# Patient Record
Sex: FEMALE | Race: WHITE | NOT HISPANIC OR LATINO | Employment: FULL TIME | URBAN - METROPOLITAN AREA
[De-identification: names, ages, dates, MRNs, and addresses within clinical notes are randomized per-mention and may not be internally consistent; named-entity substitution may affect disease eponyms.]

---

## 2017-07-28 LAB — CRC RECOMMENDATION EXT: NORMAL

## 2019-04-11 LAB — RAPID GROUP A STREP (OHS): NEGATIVE

## 2021-11-24 LAB
BUN SERPL-MCNC: 11 MG/DL (ref 7–18)
CALCIUM SERPL-MCNC: 8.8 MG/DL (ref 8.5–10.1)
CHLORIDE SERPL-SCNC: 107 MMOL/L (ref 98–107)
CO2 SERPL-SCNC: 28 MMOL/L (ref 21–32)
CREAT SERPL-MCNC: 0.74 MG/DL (ref 0.6–1.3)
GLUCOSE SERPL-MCNC: 81 MG/DL (ref 74–106)
POTASSIUM SERPL-SCNC: 4 MMOL/L (ref 3.5–5.1)
SODIUM SERPL-SCNC: 139 MEQ/L (ref 131–145)

## 2021-12-13 LAB
CHOLEST SERPL-MCNC: 187 MG/DL
HDLC SERPL-MCNC: 74 MG/DL (ref 35–60)
LDLC SERPL CALC-MCNC: 104 MG/DL
TRIGL SERPL-MCNC: 45 MG/DL (ref 30–150)

## 2021-12-16 LAB — PAP RECOMMENDATION EXT: NORMAL

## 2022-02-21 LAB — BCS RECOMMENDATION EXT: NORMAL

## 2022-04-10 ENCOUNTER — HISTORICAL (OUTPATIENT)
Dept: ADMINISTRATIVE | Facility: HOSPITAL | Age: 57
End: 2022-04-10

## 2022-04-27 VITALS
HEIGHT: 67 IN | SYSTOLIC BLOOD PRESSURE: 124 MMHG | BODY MASS INDEX: 22.91 KG/M2 | WEIGHT: 146 LBS | OXYGEN SATURATION: 98 % | DIASTOLIC BLOOD PRESSURE: 76 MMHG

## 2022-07-28 PROBLEM — G47.00 INSOMNIA: Status: ACTIVE | Noted: 2022-07-28

## 2022-07-28 PROBLEM — E03.9 HYPOTHYROIDISM: Status: ACTIVE | Noted: 2022-07-28

## 2022-09-17 ENCOUNTER — HISTORICAL (OUTPATIENT)
Dept: ADMINISTRATIVE | Facility: HOSPITAL | Age: 57
End: 2022-09-17

## 2022-10-27 PROBLEM — Z28.21 IMMUNIZATION REFUSED: Status: ACTIVE | Noted: 2022-10-27

## 2022-10-27 PROBLEM — Z23 IMMUNIZATION DUE: Status: RESOLVED | Noted: 2022-10-27 | Resolved: 2022-10-27

## 2022-10-27 PROBLEM — Z23 IMMUNIZATION DUE: Status: ACTIVE | Noted: 2022-10-27

## 2022-11-08 ENCOUNTER — DOCUMENTATION ONLY (OUTPATIENT)
Dept: ADMINISTRATIVE | Facility: HOSPITAL | Age: 57
End: 2022-11-08

## 2023-01-26 PROBLEM — Z01.818 PRE-OP TESTING: Status: ACTIVE | Noted: 2023-01-26

## 2023-09-19 PROBLEM — R07.89 LEFT-SIDED CHEST WALL PAIN: Status: ACTIVE | Noted: 2023-09-19

## 2023-10-26 PROBLEM — H65.03 NON-RECURRENT ACUTE SEROUS OTITIS MEDIA OF BOTH EARS: Status: ACTIVE | Noted: 2023-10-26

## 2023-10-26 PROBLEM — R09.82 POSTNASAL DRIP: Status: ACTIVE | Noted: 2023-10-26

## 2024-03-05 ENCOUNTER — PATIENT OUTREACH (OUTPATIENT)
Dept: ADMINISTRATIVE | Facility: HOSPITAL | Age: 59
End: 2024-03-05
Payer: COMMERCIAL

## 2024-03-05 LAB — BCS RECOMMENDATION EXT: NORMAL

## 2024-03-05 NOTE — PROGRESS NOTES
Population Health. Out Reach. Reviewing patient's chart for quality metrics.Records request sent to Dr. Ambrocio Thompson and [facility] office for colonoscopy.    3/13/24 The following record(s)  below were uploaded for Health Maintenance .        7/28/2017 COLONOSCOPY

## 2024-03-05 NOTE — LETTER
AUTHORIZATION FOR RELEASE OF   CONFIDENTIAL INFORMATION    Dear Ambrocio Monroe,    We are seeing Georgia Berry, date of birth 1965, in the clinic at [facility]. Davis Mota MD is the patient's PCP. Georgia Berry has an outstanding lab/procedure at the time we reviewed her chart. In order to help keep her health information updated, she has authorized us to request the following medical record(s):        (  )  MAMMOGRAM                                      ( * )  COLONOSCOPY      (  )  PAP SMEAR                                          (  )  OUTSIDE LAB RESULTS     (  )  DEXA SCAN                                          (  )  EYE EXAM            (  )  FOOT EXAM                                          (  )  ENTIRE RECORD     (  )  OUTSIDE IMMUNIZATIONS                 (  )  _______________         Please fax records to [facility], Manuel, Chad B., MD, [phone].     If you have any questions, please contact Edi Andrade, [facility] at [phone].           Patient Name: Georgia Berry  : 1965  Patient Phone #: [phone]

## 2024-03-05 NOTE — LETTER
AUTHORIZATION FOR RELEASE OF   CONFIDENTIAL INFORMATION    Dear [facility],    We are seeing Georgia Berry, date of birth 1965, in the clinic at [facility]. Davis Mota MD is the patient's PCP. Georgia Berry has an outstanding lab/procedure at the time we reviewed her chart. In order to help keep her health information updated, she has authorized us to request the following medical record(s):        (  )  MAMMOGRAM                                      ( * )  COLONOSCOPY      (  )  PAP SMEAR                                          (  )  OUTSIDE LAB RESULTS     (  )  DEXA SCAN                                          (  )  EYE EXAM            (  )  FOOT EXAM                                          (  )  ENTIRE RECORD     (  )  OUTSIDE IMMUNIZATIONS                 (  )  _______________         Please fax records to [facility], Manuel, Chad B., MD, [phone].     If you have any questions, please contact Edi Andrade, [facility] at [phone].           Patient Name: Georgia Berry  : 1965  Patient Phone #: [phone]

## 2024-04-07 NOTE — PROGRESS NOTES
"Follow-up (3 month follow up)       HPI:    Patient presents for 3 month recheck/refill medications.  Patient states medications are working well; she is able to concentrate, focus and stay on task.  She denies anxiety, palpitations, unintentional weight loss, headaches or dry mouth.     reviewed.    Narcotics agreement updated 01/18/2024.    Current Outpatient Medications   Medication Instructions    calcium carbonate/vitamin D3 (CALCIUM 600 WITH VITAMIN D3 ORAL) Oral, Vitamin 3 25 mcg    cholecalciferol (vitamin D3) (VITAMIN D3) 1,000 Units, Oral, Daily    cyanocobalamin 1,000 mcg/mL injection INJECT 1 ML SUBCUTANEOUSLY EVERY 2 WEEKS    estradiol 0.1 mg/24 hr td ptwk 0.1 mg/24 hr PTWK 1 patch, Transdermal, Every 7 days    hyoscyamine (ANASPAZ,LEVSIN) 125 mcg, Oral, Every 6 hours PRN    levothyroxine (SYNTHROID) 150 mcg, Oral    lisdexamfetamine (VYVANSE) 40 mg, Oral, Every morning    lisdexamfetamine (VYVANSE) 40 mg, Oral, Daily, Fill: 5/08/2024    lisdexamfetamine (VYVANSE) 40 mg, Oral, Every morning, Fill: 6/07/2024    olmesartan (BENICAR) 40 MG tablet TAKE 1 TABLET BY MOUTH ONCE DAILY    polyethylene glycol (GLYCOLAX) 17 gram PwPk Oral, Daily    progesterone (PROMETRIUM) 100 MG capsule No dose, route, or frequency recorded.         ROS:    She has been feeling okay.   She has been feeling dizzy at times; she has not noticed in a few weeks. It usually occurs late am; transient in nature.   Her blood pressure in the afternoon is usually 120/80; some times it is in the 90's/60's.   She has been tired. She has also been constipated. She has been taking Miralax. Her joints have been achy. She reports cold intolerance. She reports increased hair loss. She has had these symptoms for months.  Her appetite has been good.    She has been sleeping well.        PE:    ..Visit Vitals  /70   Pulse 80   Temp 97.8 °F (36.6 °C)   Ht 5' 6" (1.676 m)   Wt 70.1 kg (154 lb 8 oz)   SpO2 97%   BMI 24.94 kg/m²        General: "  She is well-developed well-nourished white female in no apparent distress she is alert and oriented.    Chest: Clear to auscultation bilaterally.    CV: Regular rate and rhythm without murmurs rubs or gallops.  Bilateral lower extremities: Without edema.        1. ADHD, predominantly inattentive type  Overview:  Stable/patient doing well on current treatment, will continue to closely monitor, follow-up in 3 months. 3 prescriptions printed and hand given to patient at office visit today for 3-month supply. Risks/benefits/side effects of medication discussed with patient. Patient denies any insomnia or palpitations.     07/26/2023: Patient is doing well on medications; refills x3 given.    10/26/2023: Patient is doing well on medications; refills x3 given.    01/18/2024: Patient is doing well on medications; refills x3 given.  Narcotics agreement updated.    04/09/2024: Patient is doing well on medications; refills x3 given.      Orders:  -     lisdexamfetamine (VYVANSE) 40 MG Cap; Take 1 capsule (40 mg total) by mouth once daily. Fill: 5/08/2024  Dispense: 30 capsule; Refill: 0    2. Primary hypertension  Overview:  Stable and well controlled at this time. Continue current treatment. Limit salt in diet. Monitor BP at home.     07/26/2023: Patient's blood pressure noted to be 110/70.  Patient reports fatigue at times.    Patient advised to decrease her dosage of olmesartan to 1/2 tablet daily.  Patient to monitor pressures and let me know how pressures do as well as her energy levels.    01/18/2024: Patient states her blood pressures were running in the 90 over 60s when she was taking magnesium daily.    She decreased her magnesium intake to 3 times a week.  Her blood pressures have been running in the 110s over 80s.    04/09/2024: Patient's blood pressure still runs in the 90s over 60s at times.  She states it averages in the 110s over 80s.  Patient advised to monitor pressures regularly for the next 2 weeks and let  me know how it is running.      3. Hypothyroidism, unspecified type  Overview:  Synthroid 150mcg.  TSH 2.0 in 2/2023.     04/09/2024:  Patient reports fatigue, constipation, achy joints, cold intolerance and slightly increased hair loss.    TSH and CBC pending; lab order given to patient.    Orders:  -     TSH; Future; Expected date: 04/16/2024    4. Fatigue, unspecified type  Overview:  Patient reports fatigue, cold intolerance, joint aches, constipation and slightly increased hair loss.  CBC and TSH pending.    Orders:  -     CBC Auto Differential; Future; Expected date: 04/16/2024  -     TSH; Future; Expected date: 04/16/2024    Other orders  -     lisdexamfetamine (VYVANSE) 40 MG Cap; Take 1 capsule (40 mg total) by mouth every morning.  Dispense: 30 capsule; Refill: 0  -     lisdexamfetamine (VYVANSE) 40 MG Cap; Take 1 capsule (40 mg total) by mouth every morning. Fill: 6/07/2024  Dispense: 30 capsule; Refill: 0              ..Follow up in about 3 months (around 7/9/2024) for ADD Follow Up.       Future Appointments   Date Time Provider Department Center   7/9/2024  3:45 PM Davis Mota MD [facility]

## 2024-04-09 ENCOUNTER — OFFICE VISIT (OUTPATIENT)
Dept: PRIMARY CARE CLINIC | Facility: CLINIC | Age: 59
End: 2024-04-09
Payer: COMMERCIAL

## 2024-04-09 VITALS
OXYGEN SATURATION: 97 % | HEIGHT: 66 IN | DIASTOLIC BLOOD PRESSURE: 70 MMHG | WEIGHT: 154.5 LBS | TEMPERATURE: 98 F | HEART RATE: 80 BPM | SYSTOLIC BLOOD PRESSURE: 108 MMHG | BODY MASS INDEX: 24.83 KG/M2

## 2024-04-09 DIAGNOSIS — F90.0 ADHD, PREDOMINANTLY INATTENTIVE TYPE: Primary | ICD-10-CM

## 2024-04-09 DIAGNOSIS — R53.83 FATIGUE, UNSPECIFIED TYPE: ICD-10-CM

## 2024-04-09 DIAGNOSIS — E03.9 HYPOTHYROIDISM, UNSPECIFIED TYPE: ICD-10-CM

## 2024-04-09 DIAGNOSIS — I10 PRIMARY HYPERTENSION: ICD-10-CM

## 2024-04-09 PROCEDURE — 99214 OFFICE O/P EST MOD 30 MIN: CPT | Mod: ,,, | Performed by: FAMILY MEDICINE

## 2024-04-09 RX ORDER — LISDEXAMFETAMINE DIMESYLATE 40 MG/1
40 CAPSULE ORAL EVERY MORNING
Qty: 30 CAPSULE | Refills: 0 | Status: SHIPPED | OUTPATIENT
Start: 2024-04-09 | End: 2024-05-09

## 2024-04-09 RX ORDER — LISDEXAMFETAMINE DIMESYLATE 40 MG/1
40 CAPSULE ORAL DAILY
Qty: 30 CAPSULE | Refills: 0 | Status: SHIPPED | OUTPATIENT
Start: 2024-04-09 | End: 2024-05-09

## 2024-06-30 NOTE — PROGRESS NOTES
"Follow-up       HPI:    Patient presents for 3 month recheck/refill medications.  Patient states medications are working well; she is able to concentrate, focus and stay on task.  She denies anxiety, palpitations, unintentional weight loss, headaches or dry mouth.     reviewed.    Narcotics agreement updated 01/18/2024.      Current Outpatient Medications   Medication Instructions    calcium carbonate/vitamin D3 (CALCIUM 600 WITH VITAMIN D3 ORAL) Oral, Vitamin 3 25 mcg    cholecalciferol (vitamin D3) (VITAMIN D3) 1,000 Units, Daily    cyanocobalamin 1,000 mcg, Intramuscular, Every 30 days    estradiol 0.1 mg/24 hr td ptwk 0.1 mg/24 hr PTWK 1 patch, Transdermal, Every 7 days    hyoscyamine (ANASPAZ,LEVSIN) 125 mcg, Oral, Every 6 hours PRN    levothyroxine (SYNTHROID) 150 mcg, Oral    lisdexamfetamine (VYVANSE) 40 mg, Oral, Every morning    lisdexamfetamine (VYVANSE) 40 mg, Oral, Every morning, Fill: 8/02/2024.    lisdexamfetamine (VYVANSE) 40 mg, Oral, Every morning, Fill: 9/02/2024.    olmesartan (BENICAR) 40 MG tablet TAKE 1 TABLET BY MOUTH ONCE DAILY    polyethylene glycol (GLYCOLAX) 17 gram PwPk Oral, Daily    progesterone (PROMETRIUM) 100 MG capsule No dose, route, or frequency recorded.         ROS:    She has been feeling well. She has still been tired.  Her sleep varies; she has to get up at 02:30.   Her appetite has been good.      PE:    ..Visit Vitals  /77 (BP Location: Right arm, Patient Position: Sitting, BP Method: Large (Automatic))   Pulse 75   Temp 98.8 °F (37.1 °C)   Resp 18   Ht 5' 6" (1.676 m)   Wt 69.9 kg (154 lb)   SpO2 98%   BMI 24.86 kg/m²        General:  She is well-developed well-nourished white female in no apparent distress she is alert and oriented.    Chest: Clear to auscultation bilaterally.    CV: Regular rate and rhythm without murmurs rubs or gallops.        1. ADHD, predominantly inattentive type  Overview:  Stable/patient doing well on current treatment, will continue to " closely monitor, follow-up in 3 months. 3 prescriptions printed and hand given to patient at office visit today for 3-month supply. Risks/benefits/side effects of medication discussed with patient. Patient denies any insomnia or palpitations.     07/26/2023: Patient is doing well on medications; refills x3 given.    10/26/2023: Patient is doing well on medications; refills x3 given.    01/18/2024: Patient is doing well on medications; refills x3 given.  Narcotics agreement updated.    04/09/2024: Patient is doing well on medications; refills x3 given.    07/03/2024: Patient is doing well on medications; refills x3 given.        2. Primary hypertension  Overview:  Stable and well controlled at this time. Continue current treatment. Limit salt in diet. Monitor BP at home.     07/26/2023: Patient's blood pressure noted to be 110/70.  Patient reports fatigue at times.    Patient advised to decrease her dosage of olmesartan to 1/2 tablet daily.  Patient to monitor pressures and let me know how pressures do as well as her energy levels.    01/18/2024: Patient states her blood pressures were running in the 90 over 60s when she was taking magnesium daily.    She decreased her magnesium intake to 3 times a week.  Her blood pressures have been running in the 110s over 80s.    04/09/2024: Patient's blood pressure still runs in the 90s over 60s at times.  She states it averages in the 110s over 80s.  Patient advised to monitor pressures regularly for the next 2 weeks and let me know how it is running.    07/03/2024: Her blood pressure is well controlled; continue olmesartan.      3. Hypothyroidism, unspecified type  Overview:  Synthroid 150mcg.  TSH 2.0 in 2/2023.     04/09/2024:  Patient reports fatigue, constipation, achy joints, cold intolerance and slightly increased hair loss.    TSH and CBC pending; lab order given to patient.    07/03/2024: Patient still reports fatigue.    [facility]; will  obtain results.      4. Fatigue, unspecified type  Overview:  Patient reports fatigue, cold intolerance, joint aches, constipation and slightly increased hair loss.  CBC and TSH pending.    07/03/2024: Patient still reports fatigue.    Patient wakes up at 2:30 every morning for work.  Patient may get 5-5.5 hours a night.  Patient advised to get more sleep.  Obtain lab results from [facility].      Other orders  -     lisdexamfetamine (VYVANSE) 40 MG Cap; Take 1 capsule (40 mg total) by mouth every morning.  Dispense: 30 capsule; Refill: 0  -     lisdexamfetamine (VYVANSE) 40 MG Cap; Take 1 capsule (40 mg total) by mouth every morning. Fill: 8/02/2024.  Dispense: 30 capsule; Refill: 0  -     lisdexamfetamine (VYVANSE) 40 MG Cap; Take 1 capsule (40 mg total) by mouth every morning. Fill: 9/02/2024.  Dispense: 30 capsule; Refill: 0              ..Follow up in about 3 months (around 10/3/2024) for ADD Follow Up.       Future Appointments   Date Time Provider Department Center   10/3/2024  3:30 PM [facility]

## 2024-07-03 ENCOUNTER — OFFICE VISIT (OUTPATIENT)
Dept: PRIMARY CARE CLINIC | Facility: CLINIC | Age: 59
End: 2024-07-03
Payer: COMMERCIAL

## 2024-07-03 VITALS
BODY MASS INDEX: 24.75 KG/M2 | SYSTOLIC BLOOD PRESSURE: 116 MMHG | WEIGHT: 154 LBS | TEMPERATURE: 99 F | DIASTOLIC BLOOD PRESSURE: 77 MMHG | HEART RATE: 75 BPM | OXYGEN SATURATION: 98 % | RESPIRATION RATE: 18 BRPM | HEIGHT: 66 IN

## 2024-07-03 DIAGNOSIS — R53.83 FATIGUE, UNSPECIFIED TYPE: ICD-10-CM

## 2024-07-03 DIAGNOSIS — I10 PRIMARY HYPERTENSION: ICD-10-CM

## 2024-07-03 DIAGNOSIS — F90.0 ADHD, PREDOMINANTLY INATTENTIVE TYPE: Primary | ICD-10-CM

## 2024-07-03 DIAGNOSIS — E03.9 HYPOTHYROIDISM, UNSPECIFIED TYPE: ICD-10-CM

## 2024-07-03 RX ORDER — LISDEXAMFETAMINE DIMESYLATE 40 MG/1
40 CAPSULE ORAL EVERY MORNING
Qty: 30 CAPSULE | Refills: 0 | Status: SHIPPED | OUTPATIENT
Start: 2024-07-03 | End: 2024-08-02

## 2024-07-03 RX ORDER — LISDEXAMFETAMINE DIMESYLATE 40 MG/1
40 CAPSULE ORAL EVERY MORNING
Qty: 30 CAPSULE | Refills: 0 | Status: SHIPPED | OUTPATIENT
Start: 2024-07-03

## 2024-09-26 RX ORDER — LISDEXAMFETAMINE DIMESYLATE 40 MG/1
40 CAPSULE ORAL EVERY MORNING
Qty: 30 CAPSULE | Refills: 0 | Status: SHIPPED | OUTPATIENT
Start: 2024-09-26

## 2024-09-29 NOTE — PROGRESS NOTES
"Medication Refill (Labs done 08/26/24/3 month med refill)       HPI:    Patient presents for 3 month recheck/refill medications.  Patient states medications are working well; she is able to concentrate, focus and stay on task.  She denies anxiety, palpitations, unintentional weight loss, headaches or dry mouth.     reviewed.    Narcotics agreement updated 01/18/2024.      Current Outpatient Medications   Medication Instructions    calcium carbonate/vitamin D3 (CALCIUM 600 WITH VITAMIN D3 ORAL) Take by mouth. Vitamin 3 25 mcg    cholecalciferol (vitamin D3) (VITAMIN D3) 1,000 Units, Daily    cyanocobalamin 1,000 mcg, Every 30 days    estradiol 0.1 mg/24 hr td ptwk 0.1 mg/24 hr PTWK 1 patch, Every 7 days    hyoscyamine (ANASPAZ,LEVSIN) 125 mcg, Oral, Every 6 hours PRN    levothyroxine (SYNTHROID) 150 mcg, Oral    lisdexamfetamine (VYVANSE) 40 mg, Oral, Every morning    lisdexamfetamine (VYVANSE) 40 mg, Oral, Every morning, Fill: 11/01/2024.    lisdexamfetamine (VYVANSE) 40 mg, Oral, Every morning, Fill: 12/02/2024.    olmesartan (BENICAR) 40 MG tablet TAKE 1 TABLET BY MOUTH ONCE DAILY    polyethylene glycol (GLYCOLAX) 17 gram PwPk Daily    progesterone (PROMETRIUM) 100 MG capsule No dose, route, or frequency recorded.         ROS:    She has been feeling well. She has still been tired.  Her sleep varies; she has to get up at 02:30.   Her appetite has been good.      PE:    ..Visit Vitals  /70   Pulse 78   Temp 98.6 °F (37 °C)   Ht 5' 6" (1.676 m)   Wt 68.6 kg (151 lb 4.8 oz)   SpO2 97%   BMI 24.42 kg/m²        General:  She is well-developed well-nourished white female in no apparent distress she is alert and oriented.    Chest: Clear to auscultation bilaterally.    CV: Regular rate and rhythm without murmurs rubs or gallops.        1. ADHD, predominantly inattentive type  Overview:  Stable/patient doing well on current treatment, will continue to closely monitor, follow-up in 3 months. 3 prescriptions " printed and hand given to patient at office visit today for 3-month supply. Risks/benefits/side effects of medication discussed with patient. Patient denies any insomnia or palpitations.     07/26/2023: Patient is doing well on medications; refills x3 given.    10/26/2023: Patient is doing well on medications; refills x3 given.    01/18/2024: Patient is doing well on medications; refills x3 given.  Narcotics agreement updated.    04/09/2024: Patient is doing well on medications; refills x3 given.    07/03/2024: Patient is doing well on medications; refills x3 given.    10/03/2024:  Patient is doing well on medications; refills x3 given.        2. Primary hypertension  Overview:  Stable and well controlled at this time. Continue current treatment. Limit salt in diet. Monitor BP at home.     07/26/2023: Patient's blood pressure noted to be 110/70.  Patient reports fatigue at times.    Patient advised to decrease her dosage of olmesartan to 1/2 tablet daily.  Patient to monitor pressures and let me know how pressures do as well as her energy levels.    01/18/2024: Patient states her blood pressures were running in the 90 over 60s when she was taking magnesium daily.    She decreased her magnesium intake to 3 times a week.  Her blood pressures have been running in the 110s over 80s.    04/09/2024: Patient's blood pressure still runs in the 90s over 60s at times.  She states it averages in the 110s over 80s.  Patient advised to monitor pressures regularly for the next 2 weeks and let me know how it is running.    07/03/2024: Her blood pressure is well controlled; continue olmesartan.    10/03/2024:  Her blood pressure is well controlled; continue olmesartan.      3. Iron deficiency anemia secondary to inadequate dietary iron intake  Overview:  10/03/2024:  Labs 08/23/2024: Hemoglobin/hematocrit: 11.2/38.0.  Ferritin 7.9.  Iron 23.  Iron sat 6%.  Patient saw hematologist at [facility]; will have iron transfusion.  Patient  encouraged to increase iron intake.      4. Immunization refused  Assessment & Plan:  Patient declines influenza vaccine at this time; benefits, side effects and risks discussed with patient.      Other orders  -     lisdexamfetamine (VYVANSE) 40 MG Cap; Take 1 capsule (40 mg total) by mouth every morning.  Dispense: 30 capsule; Refill: 0  -     lisdexamfetamine (VYVANSE) 40 MG Cap; Take 1 capsule (40 mg total) by mouth every morning. Fill: 11/01/2024.  Dispense: 30 capsule; Refill: 0  -     lisdexamfetamine (VYVANSE) 40 MG Cap; Take 1 capsule (40 mg total) by mouth every morning. Fill: 12/02/2024.  Dispense: 30 capsule; Refill: 0              ..No follow-ups on file.       Future Appointments   Date Time Provider Department Center   1/2/2025  3:30 PM Davis Mota MD [facility]

## 2024-10-03 ENCOUNTER — OFFICE VISIT (OUTPATIENT)
Dept: PRIMARY CARE CLINIC | Facility: CLINIC | Age: 59
End: 2024-10-03
Payer: COMMERCIAL

## 2024-10-03 VITALS
HEIGHT: 66 IN | OXYGEN SATURATION: 97 % | SYSTOLIC BLOOD PRESSURE: 115 MMHG | DIASTOLIC BLOOD PRESSURE: 70 MMHG | TEMPERATURE: 99 F | HEART RATE: 78 BPM | WEIGHT: 151.31 LBS | BODY MASS INDEX: 24.32 KG/M2

## 2024-10-03 DIAGNOSIS — I10 PRIMARY HYPERTENSION: ICD-10-CM

## 2024-10-03 DIAGNOSIS — D50.8 IRON DEFICIENCY ANEMIA SECONDARY TO INADEQUATE DIETARY IRON INTAKE: ICD-10-CM

## 2024-10-03 DIAGNOSIS — F90.0 ADHD, PREDOMINANTLY INATTENTIVE TYPE: Primary | ICD-10-CM

## 2024-10-03 DIAGNOSIS — Z28.21 IMMUNIZATION REFUSED: ICD-10-CM

## 2024-10-03 RX ORDER — LISDEXAMFETAMINE DIMESYLATE 40 MG/1
40 CAPSULE ORAL EVERY MORNING
Qty: 30 CAPSULE | Refills: 0 | Status: SHIPPED | OUTPATIENT
Start: 2024-10-03 | End: 2024-11-02

## 2024-10-03 RX ORDER — LISDEXAMFETAMINE DIMESYLATE 40 MG/1
40 CAPSULE ORAL EVERY MORNING
Qty: 30 CAPSULE | Refills: 0 | Status: SHIPPED | OUTPATIENT
Start: 2024-10-03

## 2024-10-03 NOTE — ASSESSMENT & PLAN NOTE
Patient declines influenza vaccine at this time; benefits, side effects and risks discussed with patient.

## 2024-12-29 NOTE — PROGRESS NOTES
"Follow-up       HPI:    Patient presents for 3 month recheck/refill medications.  Patient states medications are working well; she is able to concentrate, focus and stay on task.  She denies anxiety, palpitations, unintentional weight loss, headaches or dry mouth.     reviewed.    Narcotics agreement updated 01/02/2025.      Current Outpatient Medications   Medication Instructions    calcium carbonate/vitamin D3 (CALCIUM 600 WITH VITAMIN D3 ORAL) Take by mouth. Vitamin 3 25 mcg    cholecalciferol (vitamin D3) (VITAMIN D3) 1,000 Units, Daily    cyanocobalamin 1,000 mcg, Every 30 days    estradiol 0.1 mg/24 hr td ptwk 0.1 mg/24 hr PTWK 1 patch, Every 7 days    hyoscyamine (ANASPAZ,LEVSIN) 125 mcg, Oral, Every 6 hours PRN    levothyroxine (SYNTHROID) 150 mcg, Oral    lisdexamfetamine (VYVANSE) 40 mg, Oral, Every morning    lisdexamfetamine (VYVANSE) 40 mg, Oral, Every morning, Fill: 2/03/2025.    lisdexamfetamine (VYVANSE) 40 mg, Oral, Every morning    olmesartan (BENICAR) 40 MG tablet TAKE 1 TABLET BY MOUTH ONCE DAILY    polyethylene glycol (GLYCOLAX) 17 gram PwPk Daily    progesterone (PROMETRIUM) 100 MG capsule No dose, route, or frequency recorded.         ROS:    She has been feeling well. She has still been tired.  Her sleep varies; she has to get up at 02:30.   Her appetite has been good.    She is starting to feel tired again.   She received 1 iron infusion on October 4th.  Labs 12/202/2024:   She sees him again in March.      PE:    ..Visit Vitals  /76 (BP Location: Right arm, Patient Position: Sitting)   Pulse 75   Resp 18   Ht 5' 6" (1.676 m)   Wt 68 kg (150 lb)   SpO2 96%   BMI 24.21 kg/m²        General:  She is well-developed well-nourished white female in no apparent distress she is alert and oriented.    Chest: Clear to auscultation bilaterally.    CV: Regular rate and rhythm without murmurs rubs or gallops.        1. ADHD, predominantly inattentive type  Overview:  Stable/patient doing well on " current treatment, will continue to closely monitor, follow-up in 3 months. 3 prescriptions printed and hand given to patient at office visit today for 3-month supply. Risks/benefits/side effects of medication discussed with patient. Patient denies any insomnia or palpitations.     07/26/2023: Patient is doing well on medications; refills x3 given.    10/26/2023: Patient is doing well on medications; refills x3 given.    01/18/2024: Patient is doing well on medications; refills x3 given.  Narcotics agreement updated.    04/09/2024: Patient is doing well on medications; refills x3 given.    07/03/2024: Patient is doing well on medications; refills x3 given.    10/03/2024:  Patient is doing well on medications; refills x3 given.      Assessment & Plan:  Patient is doing well on medications; refills x3 printed.    Narcotics agreement updated.    Orders:  -     lisdexamfetamine (VYVANSE) 40 MG Cap; Take 1 capsule (40 mg total) by mouth every morning.  Dispense: 30 capsule; Refill: 0  -     lisdexamfetamine (VYVANSE) 40 MG Cap; Take 1 capsule (40 mg total) by mouth every morning. Fill: 2/03/2025.  Dispense: 30 capsule; Refill: 0  -     lisdexamfetamine (VYVANSE) 40 MG Cap; Take 1 capsule (40 mg total) by mouth every morning.  Dispense: 30 capsule; Refill: 0    2. Primary hypertension  Overview:  Stable and well controlled at this time. Continue current treatment. Limit salt in diet. Monitor BP at home.     07/26/2023: Patient's blood pressure noted to be 110/70.  Patient reports fatigue at times.    Patient advised to decrease her dosage of olmesartan to 1/2 tablet daily.  Patient to monitor pressures and let me know how pressures do as well as her energy levels.    01/18/2024: Patient states her blood pressures were running in the 90 over 60s when she was taking magnesium daily.    She decreased her magnesium intake to 3 times a week.  Her blood pressures have been running in the 110s over 80s.    04/09/2024: Patient's  blood pressure still runs in the 90s over 60s at times.  She states it averages in the 110s over 80s.  Patient advised to monitor pressures regularly for the next 2 weeks and let me know how it is running.    07/03/2024: Her blood pressure is well controlled; continue olmesartan.    10/03/2024:  Her blood pressure is well controlled; continue olmesartan.      3. Fatigue, unspecified type  Overview:  Patient reports fatigue, cold intolerance, joint aches, constipation and slightly increased hair loss.  CBC and TSH pending.    07/03/2024: Patient still reports fatigue.    Patient wakes up at 2:30 every morning for work.  Patient may get 5-5.5 hours a night.  Patient advised to get more sleep.  Obtain lab results from [facility].    Assessment & Plan:  Patient felt better after iron infusion October.    She is starting to have fatigue again.  Recent labs reviewed.      4. Iron deficiency anemia secondary to inadequate dietary iron intake  Overview:  10/03/2024:  Labs 08/23/2024: Hemoglobin/hematocrit: 11.2/38.0.  Ferritin 7.9.  Iron 23.  Iron sat 6%.  Patient saw hematologist at [facility]; will have iron transfusion.  Patient encouraged to increase iron intake.    Assessment & Plan:  Followed by Quinn Majano OD.   Patient had iron infusion on 10/04/2024.  Labs 12/20/2024:  Hemoglobin/hematocrit: 12.8/39.4.  Ferritin level 142.6.  Patient has follow-up in March.        5. Hypothyroidism, unspecified type  Overview:  Synthroid 150mcg.  TSH 2.0 in 2/2023.     04/09/2024:  Patient reports fatigue, constipation, achy joints, cold intolerance and slightly increased hair loss.    TSH and CBC pending; lab order given to patient.    07/03/2024: Patient still reports fatigue.    Patient did labs at [facility] in late April; will obtain results.     2024: 0.236.                ..Follow up in about 3 months (around 4/2/2025) for Follow Up, ADD Follow Up.       Future Appointments   Date Time Provider Department Center   4/2/2025   2:30 PM Davis Mota MD [facility]

## 2025-01-02 ENCOUNTER — OFFICE VISIT (OUTPATIENT)
Dept: PRIMARY CARE CLINIC | Facility: CLINIC | Age: 60
End: 2025-01-02
Payer: COMMERCIAL

## 2025-01-02 VITALS
DIASTOLIC BLOOD PRESSURE: 76 MMHG | HEIGHT: 66 IN | HEART RATE: 75 BPM | RESPIRATION RATE: 18 BRPM | BODY MASS INDEX: 24.11 KG/M2 | WEIGHT: 150 LBS | SYSTOLIC BLOOD PRESSURE: 119 MMHG | OXYGEN SATURATION: 96 %

## 2025-01-02 DIAGNOSIS — R53.83 FATIGUE, UNSPECIFIED TYPE: ICD-10-CM

## 2025-01-02 DIAGNOSIS — D50.8 IRON DEFICIENCY ANEMIA SECONDARY TO INADEQUATE DIETARY IRON INTAKE: ICD-10-CM

## 2025-01-02 DIAGNOSIS — I10 PRIMARY HYPERTENSION: ICD-10-CM

## 2025-01-02 DIAGNOSIS — E03.9 HYPOTHYROIDISM, UNSPECIFIED TYPE: ICD-10-CM

## 2025-01-02 DIAGNOSIS — F90.0 ADHD, PREDOMINANTLY INATTENTIVE TYPE: Primary | ICD-10-CM

## 2025-01-02 RX ORDER — LISDEXAMFETAMINE DIMESYLATE 40 MG/1
40 CAPSULE ORAL EVERY MORNING
Qty: 30 CAPSULE | Refills: 0 | Status: SHIPPED | OUTPATIENT
Start: 2025-01-02 | End: 2025-02-01

## 2025-01-02 RX ORDER — LISDEXAMFETAMINE DIMESYLATE 40 MG/1
40 CAPSULE ORAL EVERY MORNING
Qty: 30 CAPSULE | Refills: 0 | Status: SHIPPED | OUTPATIENT
Start: 2025-01-02

## 2025-01-02 NOTE — ASSESSMENT & PLAN NOTE
Case cancelled for hypertensive emergency  Several Bp measurements with >220/100  Decision made to cancel case for better Bp optimization  S/p 20ml IV labetalol to temporize Bp             Sabino Villavicencio MD  Anesthesiology Followed by Quinn Majano OD.   Patient had iron infusion on 10/04/2024.  Labs 12/20/2024:  Hemoglobin/hematocrit: 12.8/39.4.  Ferritin level 142.6.  Patient has follow-up in March.

## 2025-01-02 NOTE — ASSESSMENT & PLAN NOTE
Patient felt better after iron infusion October.    She is starting to have fatigue again.  Recent labs reviewed.

## 2025-01-03 DIAGNOSIS — E03.9 HYPOTHYROIDISM, UNSPECIFIED TYPE: ICD-10-CM

## 2025-01-03 RX ORDER — LEVOTHYROXINE SODIUM 150 UG/1
150 TABLET ORAL
Qty: 30 TABLET | Refills: 5 | Status: SHIPPED | OUTPATIENT
Start: 2025-01-03

## 2025-02-24 DIAGNOSIS — I10 PRIMARY HYPERTENSION: ICD-10-CM

## 2025-02-24 RX ORDER — OLMESARTAN MEDOXOMIL 40 MG/1
40 TABLET ORAL DAILY
Qty: 30 TABLET | Refills: 11 | Status: SHIPPED | OUTPATIENT
Start: 2025-02-24

## 2025-03-13 ENCOUNTER — DOCUMENTATION ONLY (OUTPATIENT)
Facility: CLINIC | Age: 60
End: 2025-03-13
Payer: COMMERCIAL

## 2025-03-13 NOTE — LETTER
AUTHORIZATION FOR RELEASE OF CONFIDENTIAL INFORMATION      2025      [facility]    We are seeing Georgia Berry, date of birth 1965, in the clinic at [facility].  Davis Mota MD is the patient's PCP. Georgia Berry has an outstanding lab/procedure at the time we reviewed his chart.  In order to help keep her health information updated, Georgia has authorized us to request the following medical record(s):          Mammogram         Please fax any records to Davis Mota MD's at  [phone]              Patient Name: Georgia Berry :1965 Patient Ph #:[phone]
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
## 2025-04-02 ENCOUNTER — OFFICE VISIT (OUTPATIENT)
Dept: PRIMARY CARE CLINIC | Facility: CLINIC | Age: 60
End: 2025-04-02
Payer: COMMERCIAL

## 2025-04-02 VITALS
SYSTOLIC BLOOD PRESSURE: 118 MMHG | DIASTOLIC BLOOD PRESSURE: 75 MMHG | HEART RATE: 67 BPM | HEIGHT: 66 IN | TEMPERATURE: 99 F | WEIGHT: 150.19 LBS | OXYGEN SATURATION: 97 % | BODY MASS INDEX: 24.14 KG/M2

## 2025-04-02 DIAGNOSIS — I10 PRIMARY HYPERTENSION: ICD-10-CM

## 2025-04-02 DIAGNOSIS — M54.2 NECK PAIN, CHRONIC: ICD-10-CM

## 2025-04-02 DIAGNOSIS — G89.29 NECK PAIN, CHRONIC: ICD-10-CM

## 2025-04-02 DIAGNOSIS — F90.0 ADHD, PREDOMINANTLY INATTENTIVE TYPE: Primary | ICD-10-CM

## 2025-04-02 RX ORDER — LISDEXAMFETAMINE DIMESYLATE 50 MG/1
50 CAPSULE ORAL EVERY MORNING
Qty: 30 CAPSULE | Refills: 0 | Status: SHIPPED | OUTPATIENT
Start: 2025-04-02 | End: 2025-05-02

## 2025-04-02 RX ORDER — HYOSCYAMINE SULFATE 0.12 MG/1
0.12 TABLET SUBLINGUAL EVERY 6 HOURS PRN
Qty: 20 TABLET | Refills: 1 | Status: SHIPPED | OUTPATIENT
Start: 2025-04-02

## 2025-04-02 RX ORDER — LISDEXAMFETAMINE DIMESYLATE 40 MG/1
40 CAPSULE ORAL EVERY MORNING
Qty: 30 CAPSULE | Refills: 0 | Status: SHIPPED | OUTPATIENT
Start: 2025-04-02 | End: 2025-05-02

## 2025-04-02 RX ORDER — LISDEXAMFETAMINE DIMESYLATE 40 MG/1
40 CAPSULE ORAL EVERY MORNING
Qty: 30 CAPSULE | Refills: 0 | Status: SHIPPED | OUTPATIENT
Start: 2025-04-02

## 2025-04-02 RX ORDER — ESTRADIOL 10 UG/1
TABLET, FILM COATED VAGINAL
COMMUNITY
Start: 2025-03-14

## 2025-04-02 RX ORDER — FERROUS GLUCONATE 324(38)MG
TABLET ORAL
COMMUNITY

## 2025-06-09 DIAGNOSIS — E03.9 HYPOTHYROIDISM, UNSPECIFIED TYPE: ICD-10-CM

## 2025-06-09 RX ORDER — LEVOTHYROXINE SODIUM 150 UG/1
150 TABLET ORAL
Qty: 30 TABLET | Refills: 5 | Status: SHIPPED | OUTPATIENT
Start: 2025-06-09

## 2025-06-28 NOTE — PROGRESS NOTES
"Medication Refill       HPI:    Patient presents for 3 month recheck/refill medications.  Patient states medications are working well; she is able to concentrate, focus and stay on task.  She denies anxiety, palpitations, unintentional weight loss, headaches or dry mouth.     reviewed.    Narcotics agreement updated 01/02/2025.    Current Outpatient Medications   Medication Instructions    ascorbic acid, vitamin C, (VITAMIN C) 100 MG tablet 1 tablet, Every morning    calcium carbonate/vitamin D3 (CALCIUM 600 WITH VITAMIN D3 ORAL) Take by mouth. Vitamin 3 25 mcg    cyanocobalamin 1,000 mcg, Every 30 days    estradioL (VAGIFEM) 10 mcg Tab 1 tablet, Every 7 days    estradiol 0.1 mg/24 hr td ptwk 0.1 mg/24 hr PTWK 1 patch, Every 7 days    hyoscyamine (ANASPAZ,LEVSIN) 125 mcg, Oral, Every 6 hours PRN    hyoscyamine 0.125 mg, Sublingual, Every 6 hours PRN    levothyroxine (SYNTHROID) 150 mcg, Oral    lisdexamfetamine (VYVANSE) 40 mg, Oral, Every morning    lisdexamfetamine (VYVANSE) 40 mg, Oral, Every morning, Fill: 8/01/2025    lisdexamfetamine (VYVANSE) 40 mg, Oral, Every morning, Fill: 9/01/2025.    olmesartan (BENICAR) 40 mg, Oral, Daily    polyethylene glycol (GLYCOLAX) 17 gram PwPk Daily    progesterone (PROMETRIUM) 100 MG capsule No dose, route, or frequency recorded.         ROS:    She has been feeling well. She has still been tired.  Her sleep varies; she has to get up at 02:30.   Her appetite has been good.      PE:    ..Visit Vitals  /82   Pulse 86   Temp 98.8 °F (37.1 °C)   Ht 5' 6" (1.676 m)   Wt 68.4 kg (150 lb 14.4 oz)   SpO2 96%   BMI 24.36 kg/m²          General:  She is well-developed well-nourished white female in no apparent distress she is alert and oriented.    Chest: Clear to auscultation bilaterally.    CV: Regular rate and rhythm without murmurs rubs or gallops.      Assessment/plan: See Problem List      ..Follow up in about 3 months (around 10/2/2025) for Follow Up, ADD Follow Up. "       Future Appointments   Date Time Provider Department Center   7/2/2025  3:30 PM Davis Mota MD [facility]    10/8/2025  3:30 PM Davis Mota MD [facility]

## 2025-07-02 ENCOUNTER — OFFICE VISIT (OUTPATIENT)
Dept: PRIMARY CARE CLINIC | Facility: CLINIC | Age: 60
End: 2025-07-02
Payer: COMMERCIAL

## 2025-07-02 VITALS
OXYGEN SATURATION: 96 % | HEIGHT: 66 IN | WEIGHT: 150.88 LBS | TEMPERATURE: 99 F | SYSTOLIC BLOOD PRESSURE: 129 MMHG | DIASTOLIC BLOOD PRESSURE: 82 MMHG | BODY MASS INDEX: 24.25 KG/M2 | HEART RATE: 86 BPM

## 2025-07-02 DIAGNOSIS — E03.9 ACQUIRED HYPOTHYROIDISM: ICD-10-CM

## 2025-07-02 DIAGNOSIS — F90.0 ADHD, PREDOMINANTLY INATTENTIVE TYPE: Primary | ICD-10-CM

## 2025-07-02 DIAGNOSIS — I10 PRIMARY HYPERTENSION: ICD-10-CM

## 2025-07-02 DIAGNOSIS — G89.29 NECK PAIN, CHRONIC: ICD-10-CM

## 2025-07-02 DIAGNOSIS — M54.2 NECK PAIN, CHRONIC: ICD-10-CM

## 2025-07-02 RX ORDER — LISDEXAMFETAMINE DIMESYLATE 40 MG/1
40 CAPSULE ORAL EVERY MORNING
Qty: 30 CAPSULE | Refills: 0 | Status: SHIPPED | OUTPATIENT
Start: 2025-07-02 | End: 2025-08-01

## 2025-07-02 RX ORDER — LISDEXAMFETAMINE DIMESYLATE 40 MG/1
40 CAPSULE ORAL EVERY MORNING
Qty: 30 CAPSULE | Refills: 0 | Status: SHIPPED | OUTPATIENT
Start: 2025-07-02

## 2025-07-02 NOTE — ASSESSMENT & PLAN NOTE
07/02/2025:  Patient continues to have neck discomfort at times.    Patient advised to apply heat/massage and do stretching.  X-rays showed degenerative changes.